# Patient Record
Sex: MALE | Race: BLACK OR AFRICAN AMERICAN | Employment: UNEMPLOYED | URBAN - METROPOLITAN AREA
[De-identification: names, ages, dates, MRNs, and addresses within clinical notes are randomized per-mention and may not be internally consistent; named-entity substitution may affect disease eponyms.]

---

## 2024-09-08 ENCOUNTER — HOSPITAL ENCOUNTER (EMERGENCY)
Age: 34
Discharge: HOME | End: 2024-09-08
Attending: EMERGENCY MEDICINE

## 2024-09-08 ENCOUNTER — APPOINTMENT (OUTPATIENT)
Dept: GENERAL RADIOLOGY | Age: 34
End: 2024-09-08

## 2024-09-08 VITALS
DIASTOLIC BLOOD PRESSURE: 87 MMHG | OXYGEN SATURATION: 98 % | TEMPERATURE: 98.4 F | WEIGHT: 315 LBS | RESPIRATION RATE: 18 BRPM | HEART RATE: 102 BPM | BODY MASS INDEX: 41.75 KG/M2 | SYSTOLIC BLOOD PRESSURE: 137 MMHG | HEIGHT: 73 IN

## 2024-09-08 DIAGNOSIS — M25.561 ACUTE PAIN OF RIGHT KNEE: Primary | ICD-10-CM

## 2024-09-08 PROCEDURE — 6370000000 HC RX 637 (ALT 250 FOR IP): Performed by: EMERGENCY MEDICINE

## 2024-09-08 PROCEDURE — 6360000002 HC RX W HCPCS: Performed by: EMERGENCY MEDICINE

## 2024-09-08 PROCEDURE — 73562 X-RAY EXAM OF KNEE 3: CPT

## 2024-09-08 PROCEDURE — 96372 THER/PROPH/DIAG INJ SC/IM: CPT

## 2024-09-08 PROCEDURE — 99284 EMERGENCY DEPT VISIT MOD MDM: CPT

## 2024-09-08 RX ORDER — HYDROCODONE BITARTRATE AND ACETAMINOPHEN 10; 325 MG/1; MG/1
1 TABLET ORAL EVERY 8 HOURS PRN
Qty: 8 TABLET | Refills: 0 | Status: SHIPPED | OUTPATIENT
Start: 2024-09-08 | End: 2024-09-13

## 2024-09-08 RX ORDER — COLCHICINE 0.6 MG/1
0.6 TABLET ORAL DAILY
Qty: 30 TABLET | Refills: 1 | Status: SHIPPED | OUTPATIENT
Start: 2024-09-08

## 2024-09-08 RX ORDER — IBUPROFEN 800 MG/1
800 TABLET, FILM COATED ORAL EVERY 8 HOURS PRN
Qty: 21 TABLET | Refills: 0 | Status: SHIPPED | OUTPATIENT
Start: 2024-09-08

## 2024-09-08 RX ORDER — PREDNISONE 20 MG/1
60 TABLET ORAL DAILY
Qty: 12 TABLET | Refills: 0 | Status: SHIPPED | OUTPATIENT
Start: 2024-09-08 | End: 2024-09-12

## 2024-09-08 RX ORDER — HYDROCODONE BITARTRATE AND ACETAMINOPHEN 10; 325 MG/1; MG/1
1 TABLET ORAL
Status: COMPLETED | OUTPATIENT
Start: 2024-09-08 | End: 2024-09-08

## 2024-09-08 RX ORDER — KETOROLAC TROMETHAMINE 30 MG/ML
60 INJECTION, SOLUTION INTRAMUSCULAR; INTRAVENOUS
Status: COMPLETED | OUTPATIENT
Start: 2024-09-08 | End: 2024-09-08

## 2024-09-08 RX ORDER — COLCHICINE 0.6 MG/1
1.2 TABLET ORAL
Status: COMPLETED | OUTPATIENT
Start: 2024-09-08 | End: 2024-09-08

## 2024-09-08 RX ADMIN — KETOROLAC TROMETHAMINE 60 MG: 60 INJECTION, SOLUTION INTRAMUSCULAR at 01:54

## 2024-09-08 RX ADMIN — COLCHICINE 1.2 MG: 0.6 TABLET, FILM COATED ORAL at 02:08

## 2024-09-08 RX ADMIN — PREDNISONE 60 MG: 50 TABLET ORAL at 01:53

## 2024-09-08 RX ADMIN — HYDROCODONE BITARTRATE AND ACETAMINOPHEN 1 TABLET: 10; 325 TABLET ORAL at 02:43

## 2024-09-08 ASSESSMENT — PAIN DESCRIPTION - ORIENTATION: ORIENTATION: RIGHT

## 2024-09-08 ASSESSMENT — LIFESTYLE VARIABLES
HOW MANY STANDARD DRINKS CONTAINING ALCOHOL DO YOU HAVE ON A TYPICAL DAY: PATIENT DOES NOT DRINK
HOW OFTEN DO YOU HAVE A DRINK CONTAINING ALCOHOL: NEVER

## 2024-09-08 ASSESSMENT — PAIN DESCRIPTION - LOCATION: LOCATION: KNEE

## 2024-09-08 ASSESSMENT — PAIN SCALES - GENERAL
PAINLEVEL_OUTOF10: 8
PAINLEVEL_OUTOF10: 10
PAINLEVEL_OUTOF10: 10

## 2024-09-08 ASSESSMENT — PAIN - FUNCTIONAL ASSESSMENT: PAIN_FUNCTIONAL_ASSESSMENT: 0-10

## 2024-11-30 ENCOUNTER — APPOINTMENT (OUTPATIENT)
Dept: GENERAL RADIOLOGY | Age: 34
End: 2024-11-30

## 2024-11-30 ENCOUNTER — HOSPITAL ENCOUNTER (EMERGENCY)
Age: 34
Discharge: HOME | End: 2024-11-30

## 2024-11-30 VITALS
TEMPERATURE: 98.8 F | BODY MASS INDEX: 41.75 KG/M2 | DIASTOLIC BLOOD PRESSURE: 95 MMHG | HEART RATE: 97 BPM | RESPIRATION RATE: 16 BRPM | HEIGHT: 73 IN | WEIGHT: 315 LBS | OXYGEN SATURATION: 98 % | SYSTOLIC BLOOD PRESSURE: 164 MMHG

## 2024-11-30 DIAGNOSIS — M10.9 ACUTE GOUT OF LEFT FOOT, UNSPECIFIED CAUSE: ICD-10-CM

## 2024-11-30 DIAGNOSIS — R36.9 PENILE DISCHARGE: Primary | ICD-10-CM

## 2024-11-30 LAB
BACTERIA URNS QL MICRO: NEGATIVE /HPF
BILIRUB UR QL: NEGATIVE
EPI CELLS #/AREA URNS HPF: NORMAL /HPF
GLUCOSE UR QL STRIP.AUTO: NEGATIVE MG/DL
HYALINE CASTS URNS QL MICRO: NORMAL /LPF
KETONES UR-MCNC: NEGATIVE MG/DL
LEUKOCYTE ESTERASE UR QL STRIP: NEGATIVE
NITRITE UR QL: NEGATIVE
PH UR: 6 (ref 5–9)
PROT UR QL: NEGATIVE MG/DL
RBC # UR STRIP: ABNORMAL
RBC #/AREA URNS HPF: NORMAL /HPF
SERVICE CMNT-IMP: ABNORMAL
SP GR UR: 1.02 (ref 1–1.02)
UROBILINOGEN UR QL: 0.2 EU/DL (ref 0.2–1)
WBC URNS QL MICRO: NORMAL /HPF

## 2024-11-30 PROCEDURE — 87491 CHLMYD TRACH DNA AMP PROBE: CPT

## 2024-11-30 PROCEDURE — 99284 EMERGENCY DEPT VISIT MOD MDM: CPT

## 2024-11-30 PROCEDURE — 87591 N.GONORRHOEAE DNA AMP PROB: CPT

## 2024-11-30 PROCEDURE — 81001 URINALYSIS AUTO W/SCOPE: CPT

## 2024-11-30 PROCEDURE — 6360000002 HC RX W HCPCS: Performed by: PHYSICIAN ASSISTANT

## 2024-11-30 PROCEDURE — 96372 THER/PROPH/DIAG INJ SC/IM: CPT

## 2024-11-30 RX ORDER — KETOROLAC TROMETHAMINE 30 MG/ML
30 INJECTION, SOLUTION INTRAMUSCULAR; INTRAVENOUS
Status: COMPLETED | OUTPATIENT
Start: 2024-11-30 | End: 2024-11-30

## 2024-11-30 RX ORDER — DOXYCYCLINE HYCLATE 100 MG
100 TABLET ORAL 2 TIMES DAILY
Qty: 14 TABLET | Refills: 0 | Status: SHIPPED | OUTPATIENT
Start: 2024-11-30 | End: 2024-12-07

## 2024-11-30 RX ORDER — PREDNISONE 20 MG/1
40 TABLET ORAL DAILY
Qty: 8 TABLET | Refills: 0 | Status: SHIPPED | OUTPATIENT
Start: 2024-11-30 | End: 2024-12-04

## 2024-11-30 RX ORDER — NAPROXEN 500 MG/1
500 TABLET ORAL 2 TIMES DAILY PRN
Qty: 28 TABLET | Refills: 0 | Status: SHIPPED | OUTPATIENT
Start: 2024-11-30 | End: 2024-12-14

## 2024-11-30 RX ADMIN — KETOROLAC TROMETHAMINE 30 MG: 30 INJECTION, SOLUTION INTRAMUSCULAR at 11:36

## 2024-11-30 RX ADMIN — LIDOCAINE HYDROCHLORIDE 1000 MG: 10 INJECTION, SOLUTION INFILTRATION; PERINEURAL at 11:39

## 2024-11-30 ASSESSMENT — ENCOUNTER SYMPTOMS
SORE THROAT: 0
BACK PAIN: 0
DIARRHEA: 0
ABDOMINAL PAIN: 0
COUGH: 0
NAUSEA: 0
SHORTNESS OF BREATH: 0
EYE REDNESS: 0
VOMITING: 0

## 2024-11-30 ASSESSMENT — PAIN SCALES - GENERAL
PAINLEVEL_OUTOF10: 10
PAINLEVEL_OUTOF10: 10

## 2024-11-30 ASSESSMENT — PAIN - FUNCTIONAL ASSESSMENT: PAIN_FUNCTIONAL_ASSESSMENT: 0-10

## 2024-11-30 ASSESSMENT — PAIN DESCRIPTION - ORIENTATION: ORIENTATION: LEFT

## 2024-11-30 ASSESSMENT — PAIN DESCRIPTION - LOCATION: LOCATION: TOE (COMMENT WHICH ONE)

## 2024-11-30 NOTE — ED PROVIDER NOTES
Emergency Department Provider Note       PCP: No, Pcp   Age: 34 y.o.   Sex: male     DISPOSITION Decision To Discharge 11/30/2024 12:16:03 PM            ICD-10-CM    1. Penile discharge  R36.9       2. Acute gout of left foot, unspecified cause  M10.9           Medical Decision Making     Patient is a 34-year-old male presenting with left first toe pain and 1 week of penile discharge.  States he has history of gout and this feels exactly like his gout in his toe.  Also concern for STD.  He is afebrile, mildly hypertensive otherwise vital signs within appropriate limits.  He has tenderness to palpation of the first MTP on the left foot no significant swelling redness or warmth to touch.  Discussed with patient obtaining x-rays of the foot however patient refused.   Urine sent for gonorrhea and chlamydia cultures.  He was given 500 mg injection of Rocephin, Toradol and prednisone.  Will be discharged with doxycycline, naproxen and short course of prednisone.  Discussed follow-up as well as reasons return to the ED.  Patient agreeable plan.     1 chronic illness with exacerbation.  1 acute, uncomplicated illness or injury.  Prescription drug management performed.  Shared medical decision making was utilized in creating the patients health plan today.    I independently ordered and reviewed each unique test.                     History     Patient is a 34-year-old male who presents with complaint of left first toe pain ongoing for 3 days and penile discharge ongoing for the last week.  States that he had his had history of gout a few months ago however at that time it was affecting his left knee.  He states since Wednesday night he has had pain and swelling in the first toe of the left foot.  Pain feels similar to when he had gout in his knee.  It hurts for him to bear weight.  He rates his pain as a 10 out of 10.  States that last time he was here he was given an injection of something that helped significantly and  was sent home with oral medications.  Additionally he has had some penile discharge and burning with urination.  He is concerned he may have an STD.  No rash or lesions.    The history is provided by the patient.       ROS     Review of Systems   Constitutional:  Negative for activity change, appetite change, chills, fatigue and fever.   HENT:  Negative for congestion and sore throat.    Eyes:  Negative for redness.   Respiratory:  Negative for cough and shortness of breath.    Cardiovascular:  Negative for chest pain.   Gastrointestinal:  Negative for abdominal pain, diarrhea, nausea and vomiting.   Genitourinary:  Positive for dysuria and penile discharge. Negative for hematuria and penile pain.   Musculoskeletal:  Negative for back pain and neck pain.        Left first toe pain   Skin:  Negative for rash.   Neurological:  Negative for light-headedness and headaches.   Psychiatric/Behavioral:  Negative for confusion.         Physical Exam     Vitals signs and nursing note reviewed:  Vitals:    11/30/24 1026   BP: (!) 164/95   Pulse: 97   Resp: 16   Temp: 98.8 °F (37.1 °C)   TempSrc: Oral   SpO2: 98%   Weight: (!) 188.7 kg (416 lb 1.6 oz)   Height: 1.854 m (6' 1\")      Physical Exam  Vitals and nursing note reviewed.   Constitutional:       General: He is not in acute distress.     Appearance: He is obese. He is not ill-appearing or toxic-appearing.   HENT:      Head: Normocephalic and atraumatic.   Cardiovascular:      Rate and Rhythm: Normal rate.      Pulses: Normal pulses.   Pulmonary:      Effort: Pulmonary effort is normal.      Breath sounds: Normal breath sounds.   Musculoskeletal:      Comments: Severely ttp of the mtp of left 1st toe, no significant swelling or redness   Skin:     General: Skin is warm and dry.   Neurological:      General: No focal deficit present.      Mental Status: He is alert and oriented to person, place, and time.   Psychiatric:         Mood and Affect: Mood normal.

## 2024-11-30 NOTE — DISCHARGE INSTRUCTIONS
You are being treated for STDs you received an injection of antibiotics here in the ER and are being sent home with 7 days worth of doxycycline please take full 7-day course.  Abstain from all sexual activity until you complete all the antibiotics and your symptoms have resolved.  I also included a 4-day course of prednisone which is a steroid and naproxen which are anti-inflammatories to help for gout in your foot.  I also included information in your paperwork about what foods to avoid when you have a history of gout.  You may call the [facility] clinic whose numbers provided above to schedule an outpatient appointment to establish care here in the area.

## 2024-11-30 NOTE — ED TRIAGE NOTES
Patient with multiple complaints during triage stating great toe on left foot started with pain and swelling on 11/27, states history of gout to knee and feels the same. Patient also expresses penile discharge x 1 week. Obtaining urine sample at this time.

## 2024-11-30 NOTE — DISCHARGE INSTR - COC
Continuity of Care Form    Patient Name: Juan Manuel Peña   :  1990  MRN:  [id number]    Admit date:  2024  Discharge date:  ***    Code Status Order: No Order   Advance Directives:   Advance Care Flowsheet Documentation             Admitting Physician:  No admitting provider for patient encounter.  PCP: No, Pcp    Discharging Nurse: ***  Discharging Hospital Unit/Room#: RPA4/RP4  Discharging Unit Phone Number: ***    Emergency Contact:   Extended Emergency Contact Information  Primary Emergency Contact: Carlotta Marino  Mobile Phone: [phone]  Relation: Girlfriend  Preferred language: English    Past Surgical History:  No past surgical history on file.    Immunization History:     There is no immunization history on file for this patient.    Active Problems:  There is no problem list on file for this patient.      Isolation/Infection:   Isolation            No Isolation          Patient Infection Status       None to display            Nurse Assessment:  Last Vital Signs: BP (!) 164/95   Pulse 97   Temp 98.8 °F (37.1 °C) (Oral)   Resp 16   Ht 1.854 m (6' 1\")   Wt (!) 188.7 kg (416 lb 1.6 oz)   SpO2 98%   BMI 54.90 kg/m²     Last documented pain score (0-10 scale): Pain Level: 10  Last Weight:   Wt Readings from Last 1 Encounters:   24 (!) 188.7 kg (416 lb 1.6 oz)     Mental Status:  {IP PT MENTAL STATUS:25787}    IV Access:  { MONICA IV ACCESS:685853946}    Nursing Mobility/ADLs:  Walking   {CHP DME ADLs:409629291}  Transfer  {CHP DME ADLs:627619605}  Bathing  {CHP DME ADLs:027479319}  Dressing  {CHP DME ADLs:406625104}  Toileting  {CHP DME ADLs:861648878}  Feeding  {CHP DME ADLs:113287083}  Med Admin  {CHP DME ADLs:973806709}  Med Delivery   { MONICA MED Delivery:219312289}    Wound Care Documentation and Therapy:        Elimination:  Continence:   Bowel: {YES / NO:}  Bladder: {YES / NO:}  Urinary Catheter: {Urinary Catheter:695182072}   Colostomy/Ileostomy/Ileal Conduit: {YES /

## 2024-12-03 LAB
C TRACH RRNA SPEC QL NAA+PROBE: NEGATIVE
N GONORRHOEA RRNA SPEC QL NAA+PROBE: NEGATIVE
SPECIMEN SOURCE: NORMAL